# Patient Record
Sex: FEMALE | URBAN - METROPOLITAN AREA
[De-identification: names, ages, dates, MRNs, and addresses within clinical notes are randomized per-mention and may not be internally consistent; named-entity substitution may affect disease eponyms.]

---

## 2018-08-20 ENCOUNTER — OFFICE VISIT (OUTPATIENT)
Dept: URBAN - METROPOLITAN AREA CLINIC 29 | Facility: CLINIC | Age: 73
End: 2018-08-20
Payer: MEDICARE

## 2018-08-20 DIAGNOSIS — H25.13 AGE-RELATED NUCLEAR CATARACT, BILATERAL: Primary | ICD-10-CM

## 2018-08-20 DIAGNOSIS — H52.223 REGULAR ASTIGMATISM, BILATERAL: ICD-10-CM

## 2018-08-20 PROCEDURE — 92004 COMPRE OPH EXAM NEW PT 1/>: CPT | Performed by: OPTOMETRIST

## 2018-08-20 ASSESSMENT — VISUAL ACUITY
OS: 20/20
OD: 20/20

## 2018-08-20 ASSESSMENT — INTRAOCULAR PRESSURE
OD: 16
OS: 16

## 2019-08-26 NOTE — IMPRESSION/PLAN
Impression: Age-related nuclear cataract, bilateral: H25.13. OU. Plan: Discussed diagnosis in detail with patient. No treatment is required at this time. Patient will update glassess RX first. Will continue to observe condition and or symptoms.
Impression: Regular astigmatism, bilateral: H52.223. OU. Plan: Discussed diagnosis in detail with patient. Discussed treatment options with patient. New glasses Rx was given today.
[Negative] : Genitourinary

## 2020-02-17 ENCOUNTER — OFFICE VISIT (OUTPATIENT)
Dept: URBAN - METROPOLITAN AREA CLINIC 23 | Facility: CLINIC | Age: 75
End: 2020-02-17
Payer: MEDICARE

## 2020-02-17 PROCEDURE — 92014 COMPRE OPH EXAM EST PT 1/>: CPT | Performed by: OPTOMETRIST

## 2020-02-17 ASSESSMENT — INTRAOCULAR PRESSURE
OD: 15
OS: 14

## 2020-02-17 NOTE — IMPRESSION/PLAN
Impression: Other chronic allergic conjunctivitis: H10.45. OU. Plan: Discussed diagnosis in detail with patient. Discussed treatment options with patient. Reassured patient of current condition and treatment. Will continue to observe condition and or symptoms. Patient instructed to call if condition gets worse. Sample (2) Pazeo QD OU x 2 weeks given to patient. Instructed patient to  use OTC Hydrocortisone Cream 1% apply to both lids x 1 week.

## 2020-02-25 ENCOUNTER — OFFICE VISIT (OUTPATIENT)
Dept: URBAN - METROPOLITAN AREA CLINIC 23 | Facility: CLINIC | Age: 75
End: 2020-02-25
Payer: MEDICARE

## 2020-02-25 DIAGNOSIS — H10.45 OTHER CHRONIC ALLERGIC CONJUNCTIVITIS: Primary | ICD-10-CM

## 2020-02-25 PROCEDURE — 99213 OFFICE O/P EST LOW 20 MIN: CPT | Performed by: OPTOMETRIST

## 2020-02-25 RX ORDER — OLOPATADINE HYDROCHLORIDE 7 MG/ML
0.7 % SOLUTION OPHTHALMIC
Qty: 1 | Refills: 1 | Status: ACTIVE
Start: 2020-02-25

## 2020-02-25 NOTE — IMPRESSION/PLAN
Impression: Other chronic allergic conjunctivitis: H10.45 OU. Plan: Discussed diagnosis in detail with patient. Reassured patient of current condition and treatment. Will continue to observe condition and or symptoms. Patient instructed to continue using artificial tears as needed. (2) Thera Tears samples given to patient in office. Will rx Paxeo 1 gtt mornings OU.